# Patient Record
Sex: MALE | Race: OTHER | Employment: OTHER | URBAN - METROPOLITAN AREA
[De-identification: names, ages, dates, MRNs, and addresses within clinical notes are randomized per-mention and may not be internally consistent; named-entity substitution may affect disease eponyms.]

---

## 2022-02-15 NOTE — PATIENT DISCUSSION
"Recommend a contact lens for vision correction to reduce symptoms of anisometropia.  A ""slab off"" may help if he chooses to continue wearing glasses. Will update Dr. Carlo Bautista. "

## 2022-02-15 NOTE — PATIENT DISCUSSION
IOL in good position OD. Reassured patient that the sutured lens was a successful procedure performed by Dr. Cathleen Hirsch.

## 2022-02-15 NOTE — PATIENT DISCUSSION
Discussed refractive and surgical options. Additional surgery is not recommended due to his history of glaucoma.

## 2022-07-13 ENCOUNTER — NEW PATIENT (OUTPATIENT)
Dept: URBAN - METROPOLITAN AREA CLINIC 32 | Facility: CLINIC | Age: 77
End: 2022-07-13

## 2022-07-13 DIAGNOSIS — E11.9: ICD-10-CM

## 2022-07-13 DIAGNOSIS — H04.123: ICD-10-CM

## 2022-07-13 DIAGNOSIS — H40.013: ICD-10-CM

## 2022-07-13 PROCEDURE — 99204 OFFICE O/P NEW MOD 45 MIN: CPT

## 2022-07-13 ASSESSMENT — KERATOMETRY
OS_AXISANGLE_DEGREES: 175
OD_K1POWER_DIOPTERS: 43.75
OD_AXISANGLE_DEGREES: 170
OD_K2POWER_DIOPTERS: 43.25
OS_K1POWER_DIOPTERS: 44.00
OS_K2POWER_DIOPTERS: 43.25
OD_AXISANGLE2_DEGREES: 80
OS_AXISANGLE2_DEGREES: 85

## 2022-07-13 ASSESSMENT — TONOMETRY
OS_IOP_MMHG: 18
OD_IOP_MMHG: 50
OD_IOP_MMHG: 42
OS_IOP_MMHG: 25

## 2022-07-13 ASSESSMENT — VISUAL ACUITY
OD_SC: 20/80
OS_SC: 20/30
OD_SC: J5
OS_SC: J3

## 2022-07-14 ENCOUNTER — FOLLOW UP (OUTPATIENT)
Dept: URBAN - METROPOLITAN AREA CLINIC 32 | Facility: CLINIC | Age: 77
End: 2022-07-14

## 2022-07-14 DIAGNOSIS — H40.012: ICD-10-CM

## 2022-07-14 DIAGNOSIS — H40.1110: ICD-10-CM

## 2022-07-14 PROCEDURE — 92083 EXTENDED VISUAL FIELD XM: CPT

## 2022-07-14 PROCEDURE — 99212 OFFICE O/P EST SF 10 MIN: CPT

## 2022-07-14 ASSESSMENT — VISUAL ACUITY
OD_SC: 20/80
OS_SC: 20/30+1

## 2022-07-14 ASSESSMENT — KERATOMETRY
OD_K1POWER_DIOPTERS: 43.75
OS_AXISANGLE_DEGREES: 175
OS_AXISANGLE2_DEGREES: 85
OD_K2POWER_DIOPTERS: 43.25
OS_K1POWER_DIOPTERS: 44.00
OD_AXISANGLE_DEGREES: 170
OD_AXISANGLE2_DEGREES: 80
OS_K2POWER_DIOPTERS: 43.25

## 2022-07-14 ASSESSMENT — TONOMETRY
OS_IOP_MMHG: 19
OD_IOP_MMHG: 13

## 2022-08-15 ENCOUNTER — FOLLOW UP (OUTPATIENT)
Dept: URBAN - METROPOLITAN AREA CLINIC 32 | Facility: CLINIC | Age: 77
End: 2022-08-15

## 2022-08-15 DIAGNOSIS — H40.012: ICD-10-CM

## 2022-08-15 DIAGNOSIS — H40.033: ICD-10-CM

## 2022-08-15 DIAGNOSIS — H40.1110: ICD-10-CM

## 2022-08-15 PROCEDURE — 99213 OFFICE O/P EST LOW 20 MIN: CPT

## 2022-08-15 PROCEDURE — 92083 EXTENDED VISUAL FIELD XM: CPT

## 2022-08-15 ASSESSMENT — KERATOMETRY
OD_AXISANGLE2_DEGREES: 80
OS_K2POWER_DIOPTERS: 43.25
OD_K2POWER_DIOPTERS: 43.25
OS_AXISANGLE2_DEGREES: 85
OD_AXISANGLE_DEGREES: 170
OS_AXISANGLE_DEGREES: 175
OD_K1POWER_DIOPTERS: 43.75
OS_K1POWER_DIOPTERS: 44.00

## 2022-08-15 ASSESSMENT — TONOMETRY
OD_IOP_MMHG: 17
OS_IOP_MMHG: 19

## 2022-08-15 ASSESSMENT — VISUAL ACUITY
OS_SC: 20/20-2
OD_SC: 20/80
OD_PH: 20/30+2

## 2022-08-22 ENCOUNTER — SURGERY/PROCEDURE (OUTPATIENT)
Dept: URBAN - METROPOLITAN AREA CLINIC 32 | Facility: CLINIC | Age: 77
End: 2022-08-22

## 2022-08-22 DIAGNOSIS — H40.031: ICD-10-CM

## 2022-08-22 PROCEDURE — 66761 REVISION OF IRIS: CPT

## 2022-08-30 ASSESSMENT — KERATOMETRY
OS_AXISANGLE2_DEGREES: 85
OD_AXISANGLE_DEGREES: 170
OS_AXISANGLE_DEGREES: 175
OD_K1POWER_DIOPTERS: 43.75
OD_K2POWER_DIOPTERS: 43.25
OS_K2POWER_DIOPTERS: 43.25
OS_K1POWER_DIOPTERS: 44.00
OD_AXISANGLE2_DEGREES: 80

## 2022-09-06 ENCOUNTER — POST-OP (OUTPATIENT)
Dept: URBAN - METROPOLITAN AREA CLINIC 32 | Facility: CLINIC | Age: 77
End: 2022-09-06

## 2022-09-06 DIAGNOSIS — H40.1110: ICD-10-CM

## 2022-09-06 DIAGNOSIS — H40.031: ICD-10-CM

## 2022-09-06 PROCEDURE — 99024 POSTOP FOLLOW-UP VISIT: CPT

## 2022-09-06 ASSESSMENT — KERATOMETRY
OS_K1POWER_DIOPTERS: 44.00
OS_AXISANGLE2_DEGREES: 85
OD_K2POWER_DIOPTERS: 43.25
OD_K1POWER_DIOPTERS: 43.75
OD_AXISANGLE_DEGREES: 170
OS_AXISANGLE_DEGREES: 175
OD_AXISANGLE2_DEGREES: 80
OS_K2POWER_DIOPTERS: 43.25

## 2022-09-06 ASSESSMENT — VISUAL ACUITY
OD_PH: 20/50-2
OD_SC: 20/70
OS_SC: 20/25-2

## 2022-09-06 ASSESSMENT — TONOMETRY
OD_IOP_MMHG: 12
OS_IOP_MMHG: 15